# Patient Record
Sex: FEMALE
[De-identification: names, ages, dates, MRNs, and addresses within clinical notes are randomized per-mention and may not be internally consistent; named-entity substitution may affect disease eponyms.]

---

## 2023-07-23 ENCOUNTER — NON-APPOINTMENT (OUTPATIENT)
Age: 63
End: 2023-07-23

## 2023-11-14 PROBLEM — Z00.00 ENCOUNTER FOR PREVENTIVE HEALTH EXAMINATION: Status: ACTIVE | Noted: 2023-11-14

## 2023-11-22 ENCOUNTER — APPOINTMENT (OUTPATIENT)
Dept: BREAST CENTER | Facility: CLINIC | Age: 63
End: 2023-11-22
Payer: COMMERCIAL

## 2023-11-22 VITALS
HEIGHT: 64 IN | WEIGHT: 188 LBS | BODY MASS INDEX: 32.1 KG/M2 | DIASTOLIC BLOOD PRESSURE: 80 MMHG | SYSTOLIC BLOOD PRESSURE: 170 MMHG | HEART RATE: 64 BPM

## 2023-11-22 PROCEDURE — 99203 OFFICE O/P NEW LOW 30 MIN: CPT

## 2024-05-14 ENCOUNTER — APPOINTMENT (OUTPATIENT)
Dept: BREAST CENTER | Facility: CLINIC | Age: 64
End: 2024-05-14
Payer: MEDICARE

## 2024-05-14 DIAGNOSIS — R92.8 OTHER ABNORMAL AND INCONCLUSIVE FINDINGS ON DIAGNOSTIC IMAGING OF BREAST: ICD-10-CM

## 2024-05-14 DIAGNOSIS — R92.30 DENSE BREASTS, UNSPECIFIED: ICD-10-CM

## 2024-05-14 PROCEDURE — 99213 OFFICE O/P EST LOW 20 MIN: CPT

## 2024-05-14 NOTE — PAST MEDICAL HISTORY
[Postmenopausal] : The patient is postmenopausal [Menarche Age ____] : age at menarche was [unfilled] [Menopause Age____] : age at menopause was [unfilled] [Total Preg ___] : G[unfilled] [FreeTextEntry5] : EUSEBIO

## 2024-05-14 NOTE — ASSESSMENT
[FreeTextEntry1] : Jany is 64 year old female here for follow up visit s/p benign breast bx   Her imaging is as follows: 10/5/23 b/l mammo-->birads0 -scattered areas of fibroglandular density. - right breast at 6:00 location there is a focal asymmetry for which additional imaging is recommended   11/1/23 right dx mammo and US-->BIRADS4 -Breast Density: Heterogeneously dense - new focal 6 mm asymmetry in the central right breast.  11/6/23 Breast, right 6:00 tissue, stereotactic biopsy: (triny) - Cystic apocrine metaplasia -pathology results are concordant  5/7/24 right dx mammo -->birads2 Breast Density: Heterogeneously dense  biopsy clip in the right breast, stable in position. No suspicious abnormalities were seen sonographically.  We discussed dense breasts.  Increasing breast density has been found to increase ones risk of breast cancer, but at this time, there is no clear indication for additional imaging in this setting, as both US and MRI have not been found to improve survival.  One can consider bilateral screening US.  However, out of 1000 women screened, the use of routine US will only identify an additional 3-5 cancers.  The use of US was found to increase the likelihood of undergoing more imaging and more biopsies.  She does have dense breasts.  We have decided to proceed with screening bilateral breast US at this time.  This will be scheduled with her next screening mammogram.   plan: -b/l mammo and UA on 10/6/24 -follow up after imaging

## 2024-05-14 NOTE — DATA REVIEWED
[FreeTextEntry1] : 	May 7, 2024	   No imaging evidence of malignancy on the current exam(s).   A 1 year screening mammogram of both breast(s) is recommended.   The patient will be sent a normal letter.   BI-RADS 2: BENIGN     The findings and recommendations were communicated to the patient.     MAMMO TOMOSYNTHESIS DIAGNOSTIC RIGHT   Clinical Breast Exam: Patient does report clinical breast exam in the last year.   Clinical Indication: Patient having a short term follow up of the right breast. No family history of breast cancer.   Compared to: 10/04/2023, 08/20/2022, 03/29/2021   MAMMOGRAM:    Tomosynthesis 3D and 2D imaging of the breast(s) were performed.  Current study was also evaluated with a computer aided detection (CAD) system.   Breast Density: Heterogeneously dense, which may obscure small masses.   No suspicious masses, calcifications, or other findings are seen. There is a biopsy clip in the right breast, stable in position.   US BREAST COMPLETE RIGHT   Ultrasound evaluation was performed including examination of all four quadrants of the breast(s) and the retroareolar regions.     Color flow, Gray scale and real-time ultrasound of the right breast was performed.    No suspicious abnormalities were seen sonographically.

## 2024-05-14 NOTE — PHYSICAL EXAM
[Normocephalic] : normocephalic [Atraumatic] : atraumatic [EOMI] : extra ocular movement intact [Examined in the supine and seated position] : examined in the supine and seated position [Symmetrical] : symmetrical [No dominant masses] : no dominant masses in right breast  [No dominant masses] : no dominant masses left breast [No Nipple Retraction] : no left nipple retraction [No Nipple Discharge] : no left nipple discharge [No Axillary Lymphadenopathy] : no left axillary lymphadenopathy [No Edema] : no edema [No Rashes] : no rashes [No Ulceration] : no ulceration [de-identified] : On physical exam, there are no discrete masses in either breast or axilla. There is no nipple discharge or inversion bilaterally. There are no skin changes bilaterally.

## 2024-05-14 NOTE — HISTORY OF PRESENT ILLNESS
[FreeTextEntry1] : Jany is 64 year old female here for follow up visit s/p benign breast bx   She has no breast related complaints at this time.  She denies any breast pain, has not palpated any new palpable masses in either breast and denies any nipple discharge or retraction.   Her imaging is as follows: 10/5/23 b/l mammo-->birads0 -scattered areas of fibroglandular density. - right breast at 6:00 location there is a focal asymmetry for which additional imaging is recommended   11/1/23 right dx mammo and US-->BIRADS4 -Breast Density: Heterogeneously dense - new focal 6 mm asymmetry in the central right breast.  11/6/23 Breast, right 6:00 tissue, stereotactic biopsy: ([place]) - Cystic apocrine metaplasia -pathology results are concordant  5/7/24 right dx mammo -->birads2 Breast Density: Heterogeneously dense  biopsy clip in the right breast, stable in position. No suspicious abnormalities were seen sonographically.   HISTORICAL RISK FACTORS: -no family history of breast or ovarian cancer -G0 -EUSEBIO 2019 -no HRT

## 2024-12-19 ENCOUNTER — APPOINTMENT (OUTPATIENT)
Dept: BREAST CENTER | Facility: CLINIC | Age: 64
End: 2024-12-19